# Patient Record
Sex: MALE | Race: OTHER
[De-identification: names, ages, dates, MRNs, and addresses within clinical notes are randomized per-mention and may not be internally consistent; named-entity substitution may affect disease eponyms.]

---

## 2018-10-24 NOTE — EMERGENCY ROOM REPORT
History of Present Illness


General


Chief Complaint:  Back Pain-No Injury


Source:  Patient





Present Illness


HPI


Patient is 18-year-old male who presents today with left upper back pain that 

began 5 days ago.  He states he is a  and lives heavy trays and is left 

hand for work every day.  He states that over the past week it has been 

worsening and is now having left upper back pain rated at 7 out of 10 in 

severity.  He has been taking Motrin with some relief.  He states pain is worse 

with palpation.  He denies any numbness, tingling, loss of sensation or 

associated symptoms.


Allergies:  


Coded Allergies:  


     No Known Allergies (Unverified , 10/24/18)





Patient History


Reviewed Nursing Documentation:  PMH: Agreed; PSxH: Agreed





Nursing Documentation-PMH


Past Medical History:  No Stated History





Review of Systems


Musculoskeletal:  Reports: back pain


All Other Systems:  negative except mentioned in HPI





Physical Exam





Vital Signs








  Date Time  Temp Pulse Resp B/P (MAP) Pulse Ox O2 Delivery O2 Flow Rate FiO2


 


10/24/18 17:02 98.2 67 18 145/85 96 Room Air  








Sp02 EP Interpretation:  reviewed, normal


General Appearance:  no apparent distress, alert, GCS 15, non-toxic


Head:  normocephalic, atraumatic


Eyes:  bilateral eye normal inspection, bilateral eye PERRL


ENT:  hearing grossly normal, normal pharynx, no angioedema, normal voice


Neck:  full range of motion, supple/symm/no masses


Respiratory:  chest non-tender, lungs clear, normal breath sounds, speaking 

full sentences


Cardiovascular #1:  regular rate, rhythm, no edema


Cardiovascular #2:  2+ carotid (R), 2+ carotid (L), 2+ radial (R), 2+ radial (L)

, 2+ dorsalis pedis (R), 2+ dorsalis pedis (L)


Gastrointestinal:  normal bowel sounds, non tender, soft, non-distended, no 

guarding, no rebound


Rectal:  deferred


Genitourinary:  normal inspection, no CVA tenderness


Musculoskeletal:  back normal, gait/station normal, normal range of motion, 

calf tenderness, other - point tenderness over the muscular area for left upper 

back, no midline tenderness, full range motion in all joints


Neurologic:  alert, oriented x3, responsive, motor strength/tone normal, 

sensory intact, speech normal


Psychiatric:  judgement/insight normal, memory normal, mood/affect normal, no 

suicidal/homicidal ideation


Reflexes:  3+ bicep (R), 3+ bicep (L), 3+ tricep (R), 3+ tricep (L), 3+ knee (R)

, 3+ knee (L)


Skin:  normal color, no rash, warm/dry, well hydrated


Lymphatic:  no adenopathy





Medical Decision Making


PA Attestation


supervising physician is Dr. Jay


Reaction to Intervention:  Improved


Diagnostic Impression:  


 Primary Impression:  


 Trapezius muscle spasm


ER Course


Patient has point tenderness over the muscular area of the left trapezius, low 

index of suspicion for fracture.  Imaging is considered but not indicated at 

this time.  Patient is requesting a work note.  He is discharged home with 

instructions on symptomatic relief and is instructed to take NSAIDs for pain.  

Patient understands plan and is agreeable.





Last Vital Signs








  Date Time  Temp Pulse Resp B/P (MAP) Pulse Ox O2 Delivery O2 Flow Rate FiO2


 


10/24/18 17:02 98.2 69 18 145/85 96 Room Air  








Status:  improved


Disposition:  HOME, SELF-CARE


Condition:  Stable


Scripts


Cyclobenzaprine Hcl* (FLEXERIL*) 10 Mg Tablet


10 MG ORAL THREE TIMES A DAY, #10 TAB


   Prov: Leila Fairbanks         10/24/18


Patient Instructions:  Back Pain, Adult











Leila Fairbanks Oct 24, 2018 17:59

## 2019-10-11 ENCOUNTER — HOSPITAL ENCOUNTER (EMERGENCY)
Dept: HOSPITAL 72 - EMR | Age: 19
Discharge: LEFT BEFORE DISCHARGE | End: 2019-10-11
Payer: COMMERCIAL

## 2019-10-11 VITALS — HEIGHT: 66 IN | WEIGHT: 145 LBS | BODY MASS INDEX: 23.3 KG/M2

## 2019-10-11 VITALS — DIASTOLIC BLOOD PRESSURE: 67 MMHG | SYSTOLIC BLOOD PRESSURE: 112 MMHG

## 2019-10-11 VITALS — SYSTOLIC BLOOD PRESSURE: 112 MMHG | DIASTOLIC BLOOD PRESSURE: 67 MMHG

## 2019-10-11 DIAGNOSIS — S61.011A: Primary | ICD-10-CM
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
## 2019-10-11 PROCEDURE — 99282 EMERGENCY DEPT VISIT SF MDM: CPT

## 2019-10-11 NOTE — EMERGENCY ROOM REPORT
History of Present Illness


General


Chief Complaint:  Laceration


Source:  Patient





Present Illness


HPI


19-year-old male presents to the emergency department complaining of 6 out of 

10 severity burning sensation after laceration 2 days ago to the right thumb.  

Patient reports he is right-hand dominant he states he cut his finger while at 

work and a large piece of glass.  Patient denies suspicion of foreign body.  

Patient states he is not up-to-date with tetanus however he does not want to 

receive it today.  He denies bleeding at this time he denies taking blood 

thinning medications.  Denies paresthesias or loss of gross motor movements.  

No other aggravating or relieving factors.


Allergies:  


Coded Allergies:  


     No Known Allergies (Unverified , 10/24/18)





Patient History


Past Medical History:  see triage record


Past Surgical History:  none


Pertinent Family History:  none


Reviewed Nursing Documentation:  PMH: Agreed; PSxH: Agreed





Nursing Documentation-PMH


Past Medical History:  No Stated History





Review of Systems


All Other Systems:  negative except mentioned in HPI





Physical Exam





Vital Signs








  Date Time  Temp Pulse Resp B/P (MAP) Pulse Ox O2 Delivery O2 Flow Rate FiO2


 


10/11/19 14:12 98.2 66 18 112/67 (82) 96 Room Air  








Sp02 EP Interpretation:  reviewed, normal


General Appearance:  no apparent distress, alert, GCS 15, non-toxic


Head:  normocephalic, atraumatic


Eyes:  bilateral eye normal inspection, bilateral eye PERRL


ENT:  hearing grossly normal, normal voice


Neck:  full range of motion


Respiratory:  lungs clear, normal breath sounds, speaking full sentences


Cardiovascular #1:  regular rate, rhythm, normal capillary refill


Musculoskeletal:  gait/station normal, normal range of motion, non-tender


Neurologic:  alert, oriented x3, responsive, motor strength/tone normal, 

sensory intact, speech normal, grossly normal


Psychiatric:  judgement/insight normal


Skin:  laceration - 0.3cm laceration to the finger pad of the right thumb.





Medical Decision Making


PA Attestation


Dr. Talamantes is my supervising Physician whom patient management has been 

discussed with.


Diagnostic Impression:  


 Primary Impression:  


 Laceration


ER Course


19-year-old male presents to the emergency department complaining of 6 out of 

10 severity burning sensation after laceration 2 days ago to the right thumb.  

Patient reports he is right-hand dominant he states he cut his finger while at 

work and a large piece of glass.  Patient denies suspicion of foreign body.  

Patient states he is not up-to-date with tetanus however he does not want to 

receive it today.  He denies bleeding at this time he denies taking blood 

thinning medications.  Denies paresthesias or loss of gross motor movements.  

No other aggravating or relieving factors.





Ddx considered but are not limited to laceration, tendon injury, cellulitis, 

amputation





Vital signs: are WNL, pt. is afebrile


H&PE are most consistent with:  0.3cm laceration to the finger pad of the right 

thumb. , No obvious FB





ORDERS: none required at this time, the diagnosis is clinical





ED INTERVENTIONS: 





-Tetanus vaccine Declined by Pt.


- Laceration irrigated by ED tech.  





** no delayed closure due to increased risk of infection 


 





Discussed with patient:  That we make every effort to approximate the 

laceration as best as we can so that scarring will be as cosmetically pleasing 

as possible with our limited cosmetic skill set in the Emergency dept.  

Regardless of our best efforts there will be scarring after laceration repair.  

The extent of scarring is unknown at this time.  








DISCHARGE: At this time pt. is stable for d/c to home. Will provide printed 

patient care instructions, and any necessary prescriptions. Care plan and 

follow up instructions have been discussed with the patient prior to discharge.





Per RN: PT. Left/eloped prior to receiving D/C paperwork .. already d/w pt. D/C 

instructions.





Last Vital Signs








  Date Time  Temp Pulse Resp B/P (MAP) Pulse Ox O2 Delivery O2 Flow Rate FiO2


 


10/11/19 14:38 98.2 67 18 112/67 96 Room Air  








Disposition:  HOME, SELF-CARE


Condition:  Stable


Scripts


Acetaminophen* (TYLENOL EXTRA STRENGTH*) 500 Mg Tablet


500 MG ORAL Q6H, #20 TAB 0 Refills


   Prov: Yola Sweeney         10/11/19 


Cephalexin* (KEFLEX*) 500 Mg Capsule


500 MG ORAL EVERY 12 HOURS for 7 Days, #14 CAP 0 Refills


   Prov: Yola Sweeney         10/11/19 


Bacitracin/Polymyxin B Sulfate (BACITRACIN-POLYMYXIN OINTMENT) 28.35 Gm 

Oint...g.


1 APPLIC TP BID, #28.3 GM


   Prov: Yola Sweeney         10/11/19


Referrals:  


[facility],REFERRING (PCP)


Patient Instructions:  Nonsutured Laceration Care





Additional Instructions:  


Take medications as directed. 





 ** Follow up with a Primary Care Provider in 3-5 days, even if your symptoms 

have resolved. ** 





Return sooner to ED if new symptoms occur, or current symptoms become worse. 














- Please note that this Emergency Department Report was dictated using [employer] technology software, occasionally this can lead to 

erroneous entry secondary to interpretation by the dictation equipment.











Yola Sweeney Oct 11, 2019 15:09

## 2019-10-11 NOTE — NUR
ED Nurse Note:



Pt cleared by health care Provider for discharge.  Pt left without signing DC 
instruction papers and did not take the prescription.  All medical deviecs such 
as ID band  removed. Pt is AAO x4, ambulatory and left with all personal 
belongings.

## 2019-10-11 NOTE — NUR
ED Nurse Note:



first contact with pt. yesy/ox4. came to eD due to laceration on right 1st digit 
x2days. Per pt, he cleaned with alcohol wipe and denies any 
dicharge/itching/swelling